# Patient Record
Sex: MALE | Race: WHITE | NOT HISPANIC OR LATINO | Employment: OTHER | URBAN - METROPOLITAN AREA
[De-identification: names, ages, dates, MRNs, and addresses within clinical notes are randomized per-mention and may not be internally consistent; named-entity substitution may affect disease eponyms.]

---

## 2020-07-25 ENCOUNTER — TELEPHONE ENCOUNTER (OUTPATIENT)
Dept: URBAN - METROPOLITAN AREA CLINIC 13 | Facility: CLINIC | Age: 80
End: 2020-07-25

## 2020-07-25 RX ORDER — LANSOPRAZOLE 15 MG/1
TAKE 1 CAPSULE DAILY PRN REFLUX CAPSULE, DELAYED RELEASE ORAL
Refills: 0 | OUTPATIENT
End: 2014-05-14

## 2020-07-25 RX ORDER — POLYETHYLENE GLYCOL 3350, SODIUM CHLORIDE, SODIUM BICARBONATE AND POTASSIUM CHLORIDE WITH LEMON FLAVOR 420; 11.2; 5.72; 1.48 G/4L; G/4L; G/4L; G/4L
TAKE  ML AS DIRECTED MIX CONTENTS PER DIRECTIONS, BEGIN DRINKING 1/2 SOLUTION @ 5P, COMPLETE REMAINDER OF SOLUTION 6HR PRIOR TO PROCEDURE POWDER, FOR SOLUTION ORAL
Qty: 1 | Refills: 0 | OUTPATIENT
Start: 2014-05-09 | End: 2014-05-14

## 2020-07-25 RX ORDER — METOPROLOL SUCCINATE 100 MG/1
TAKE 1 TABLET ONCE DAILY TABLET, EXTENDED RELEASE ORAL
Refills: 0 | OUTPATIENT
Start: 2006-03-23 | End: 2010-02-12

## 2020-07-25 RX ORDER — POLYETHYLENE GLYCOL 3350, SODIUM CHLORIDE, SODIUM BICARBONATE AND POTASSIUM CHLORIDE WITH LEMON FLAVOR 420; 11.2; 5.72; 1.48 G/4L; G/4L; G/4L; G/4L
USE AS DIRECTED POWDER, FOR SOLUTION ORAL
Qty: 1 | Refills: 0 | OUTPATIENT
Start: 2015-06-05 | End: 2015-06-08

## 2020-07-26 ENCOUNTER — TELEPHONE ENCOUNTER (OUTPATIENT)
Dept: URBAN - METROPOLITAN AREA CLINIC 13 | Facility: CLINIC | Age: 80
End: 2020-07-26

## 2020-07-26 RX ORDER — LANSOPRAZOLE 30 MG/1
TAKE 1 CAPSULE DAILY AS NEEDED CAPSULE, DELAYED RELEASE ORAL
Qty: 30 | Refills: 3 | Status: ACTIVE | COMMUNITY

## 2020-07-26 RX ORDER — DRONEDARONE 400 MG/1
TAKE 1 TABLET TWICE DAILY,  WITH MORNING AND EVENING MEAL TABLET, FILM COATED ORAL
Refills: 0 | Status: ACTIVE | COMMUNITY

## 2020-07-26 RX ORDER — ATORVASTATIN CALCIUM 10 MG/1
TAKE 1 TABLET EVERY OTHER DAY ALTERNATING WITH 0.5 TAB QOD TABLET, FILM COATED ORAL
Refills: 0 | Status: ACTIVE | COMMUNITY

## 2021-10-21 ENCOUNTER — CLAIMS CREATED FROM THE CLAIM WINDOW (OUTPATIENT)
Dept: URBAN - METROPOLITAN AREA MEDICAL CENTER 19 | Facility: MEDICAL CENTER | Age: 81
End: 2021-10-21
Payer: MEDICARE

## 2021-10-21 DIAGNOSIS — J84.10 PULMONARY FIBROSIS, UNSPECIFIED: ICD-10-CM

## 2021-10-21 DIAGNOSIS — R06.02 EXERTIONAL SHORTNESS OF BREATH: ICD-10-CM

## 2021-10-21 DIAGNOSIS — T39.395A ADVERSE EFFECT OF OTHER NONSTEROIDAL ANTI-INFLAMMATORY DRUGS [NSAID], INITIAL ENCOUNTER: ICD-10-CM

## 2021-10-21 DIAGNOSIS — D50.0 IRON DEFICIENCY ANEMIA SECONDARY TO BLOOD LOSS (CHRONIC): ICD-10-CM

## 2021-10-21 DIAGNOSIS — K44.9 DIAPHRAGMATIC HERNIA WITHOUT OBSTRUCTION OR GANGRENE: ICD-10-CM

## 2021-10-21 PROCEDURE — 99223 1ST HOSP IP/OBS HIGH 75: CPT | Performed by: INTERNAL MEDICINE

## 2021-10-22 ENCOUNTER — CLAIMS CREATED FROM THE CLAIM WINDOW (OUTPATIENT)
Dept: URBAN - METROPOLITAN AREA MEDICAL CENTER 19 | Facility: MEDICAL CENTER | Age: 81
End: 2021-10-22
Payer: MEDICARE

## 2021-10-22 DIAGNOSIS — D50.0 IRON DEFICIENCY ANEMIA SECONDARY TO BLOOD LOSS (CHRONIC): ICD-10-CM

## 2021-10-22 DIAGNOSIS — K44.9 DIAPHRAGMATIC HERNIA WITHOUT OBSTRUCTION OR GANGRENE: ICD-10-CM

## 2021-10-22 DIAGNOSIS — T39.395A ADVERSE EFFECT OF OTHER NONSTEROIDAL ANTI-INFLAMMATORY DRUGS [NSAID], INITIAL ENCOUNTER: ICD-10-CM

## 2021-10-22 DIAGNOSIS — J84.10 PULMONARY FIBROSIS, UNSPECIFIED: ICD-10-CM

## 2021-10-22 PROCEDURE — 99232 SBSQ HOSP IP/OBS MODERATE 35: CPT | Performed by: INTERNAL MEDICINE

## 2021-10-29 ENCOUNTER — OFFICE VISIT (OUTPATIENT)
Dept: URBAN - METROPOLITAN AREA CLINIC 107 | Facility: CLINIC | Age: 81
End: 2021-10-29
Payer: MEDICARE

## 2021-10-29 ENCOUNTER — WEB ENCOUNTER (OUTPATIENT)
Dept: URBAN - METROPOLITAN AREA CLINIC 107 | Facility: CLINIC | Age: 81
End: 2021-10-29

## 2021-10-29 VITALS
HEART RATE: 89 BPM | SYSTOLIC BLOOD PRESSURE: 151 MMHG | BODY MASS INDEX: 29.06 KG/M2 | HEIGHT: 70 IN | DIASTOLIC BLOOD PRESSURE: 94 MMHG | TEMPERATURE: 97 F | WEIGHT: 203 LBS

## 2021-10-29 DIAGNOSIS — K44.9 LARGE HIATAL HERNIA: ICD-10-CM

## 2021-10-29 DIAGNOSIS — D50.0 IRON DEFICIENCY ANEMIA DUE TO CHRONIC BLOOD LOSS: ICD-10-CM

## 2021-10-29 PROCEDURE — 99214 OFFICE O/P EST MOD 30 MIN: CPT | Performed by: NURSE PRACTITIONER

## 2021-10-29 RX ORDER — BRIMONIDINE TARTRATE 2 MG/ML
1 DROP INTO AFFECTED EYE SOLUTION/ DROPS OPHTHALMIC
Status: ACTIVE | COMMUNITY

## 2021-10-29 RX ORDER — BUDESONIDE AND FORMOTEROL FUMARATE DIHYDRATE 160; 4.5 UG/1; UG/1
2 PUFFS AEROSOL RESPIRATORY (INHALATION) TWICE A DAY
Status: ACTIVE | COMMUNITY

## 2021-10-29 RX ORDER — GLIMEPIRIDE 2 MG/1
1 TABLET WITH BREAKFAST OR THE FIRST MAIN MEAL OF THE DAY TABLET ORAL ONCE A DAY
Status: ACTIVE | COMMUNITY

## 2021-10-29 RX ORDER — LATANOPROST 50 UG/ML
1 DROP INTO AFFECTED EYE IN THE EVENING SOLUTION/ DROPS OPHTHALMIC ONCE A DAY
Status: ACTIVE | COMMUNITY

## 2021-10-29 RX ORDER — PREDNISONE 5 MG/1
1 TABLET TABLET ORAL 3 TIMES DAILY
Status: ACTIVE | COMMUNITY

## 2021-10-29 RX ORDER — METOPROLOL SUCCINATE 50 MG/1
1 TABLET TABLET, FILM COATED, EXTENDED RELEASE ORAL ONCE A DAY
Status: ACTIVE | COMMUNITY

## 2021-10-29 RX ORDER — GABAPENTIN 100 MG/1
1 CAPSULE CAPSULE ORAL ONCE A DAY
Status: ACTIVE | COMMUNITY

## 2021-10-29 RX ORDER — LANSOPRAZOLE 30 MG/1
TAKE 1 CAPSULE DAILY AS NEEDED CAPSULE, DELAYED RELEASE ORAL
Qty: 30 | Refills: 3 | Status: ON HOLD | COMMUNITY

## 2021-10-29 RX ORDER — OMEPRAZOLE 20 MG/1
1 CAPSULE 30 MINUTES BEFORE MORNING MEAL CAPSULE, DELAYED RELEASE ORAL TWICE DAILY
Status: ACTIVE | COMMUNITY

## 2021-10-29 RX ORDER — NINTEDANIB 100 MG/1
1 CAPSULE CAPSULE ORAL
Status: ACTIVE | COMMUNITY

## 2021-10-29 RX ORDER — OMEPRAZOLE 20 MG/1
1 CAPSULE 30 MINUTES BEFORE MORNING MEAL CAPSULE, DELAYED RELEASE ORAL TWICE DAILY
OUTPATIENT

## 2021-10-29 RX ORDER — ATORVASTATIN CALCIUM 10 MG/1
TAKE 1 TABLET EVERY OTHER DAY ALTERNATING WITH 0.5 TAB QOD TABLET, FILM COATED ORAL
Refills: 0 | Status: ON HOLD | COMMUNITY

## 2021-10-29 RX ORDER — DRONEDARONE 400 MG/1
TAKE 1 TABLET TWICE DAILY,  WITH MORNING AND EVENING MEAL TABLET, FILM COATED ORAL
Refills: 0 | Status: ON HOLD | COMMUNITY

## 2021-10-29 NOTE — HPI-TODAY'S VISIT:
81-year-old male with a past medical history of idiopathic pulmonary fibrosis on home oxygen, atrial fibrillation, provoked PE status post lumbar surgery, diverticulosis, GERD, iron deficiency anemia and large hiatal hernia presenting for follow-up after hospitalization for progressive fatigue, shortness of breath and decreasing SPO2 on home monitor, incidentally found to have anemia with a hemoglobin of 6.1 on presentation to the ED.  He was seen in consultation by Dr. Troy Gomez.  He denied any nausea or vomiting.  There was no hematemesis, hematochezia, melena or diarrhea.  He endorsed using 6 Motrin tablets daily for many years.  He had stopped PPI therapy independently.  He had a similar work-up in 2015 with Dr. Sherman including EGD revealing the large hiatal hernia, colonoscopy notable for diverticulosis and a normal capsule endoscopy.  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  He was to be transfused to maintain hemoglobin greater than 7.  He was resumed on daily PPI which she was to continue indefinitely.  He was recommended an EGD if cleared by pulmonary.  Dr. Mccurdy with pulmonology did not feel that his pulmonary status was ideal for a sedated procedure.  Therefore the EGD was not performed.  He was not having any evidence of bleeding.  He was recommended supportive treatment including transfusion for hemoglobin less than 8.  He was to continue PPI indefinitely and limit his NSAIDs.  He was also recommended outpatient referral to surgery for discussion of a hiatal hernia repair.  He tells me that his shortness of breath is better today. He denies any lightheadedness or chest pain. No nausea or vomiting. No abdominal pain. No melena or red blood per rectum. He has bowel movements daily. He stopped all NSAIDs and is only using Tylenol. He is on prednisone 15 mg daily for his pulmonary fibrosis, as well as Ofev. He is taking omeprazole 20 mg twice daily as well.

## 2021-10-30 LAB
BASO (ABSOLUTE): 0
BASOS: 0
EOS (ABSOLUTE): 0
EOS: 0
HEMATOCRIT: 37
HEMATOLOGY COMMENTS:: (no result)
HEMOGLOBIN: 10.5
IMMATURE CELLS: (no result)
IMMATURE GRANS (ABS): 0.1
IMMATURE GRANULOCYTES: 1
LYMPHS (ABSOLUTE): 1
LYMPHS: 10
MCH: 21.5
MCHC: 28.4
MCV: 76
MONOCYTES(ABSOLUTE): 0.6
MONOCYTES: 6
NEUTROPHILS (ABSOLUTE): 8.4
NEUTROPHILS: 83
NRBC: (no result)
PLATELETS: 307
RBC: 4.88
RDW: 23.6
WBC: 10.1

## 2021-11-01 ENCOUNTER — TELEPHONE ENCOUNTER (OUTPATIENT)
Dept: URBAN - METROPOLITAN AREA CLINIC 113 | Facility: CLINIC | Age: 81
End: 2021-11-01

## 2021-11-10 ENCOUNTER — TELEPHONE ENCOUNTER (OUTPATIENT)
Dept: URBAN - METROPOLITAN AREA CLINIC 113 | Facility: CLINIC | Age: 81
End: 2021-11-10

## 2021-11-13 LAB
BASO (ABSOLUTE): 0
BASOS: 0
EOS (ABSOLUTE): 0.2
EOS: 2
HEMATOCRIT: 37.8
HEMATOLOGY COMMENTS:: (no result)
HEMOGLOBIN: 10.5
IMMATURE CELLS: (no result)
IMMATURE GRANS (ABS): 0
IMMATURE GRANULOCYTES: 0
LYMPHS (ABSOLUTE): 3.2
LYMPHS: 33
MCH: 21.7
MCHC: 27.8
MCV: 78
MONOCYTES(ABSOLUTE): 0.8
MONOCYTES: 8
NEUTROPHILS (ABSOLUTE): 5.4
NEUTROPHILS: 57
NRBC: (no result)
PLATELETS: 327
RBC: 4.84
RDW: (no result)
WBC: 9.7

## 2021-11-30 ENCOUNTER — OFFICE VISIT (OUTPATIENT)
Dept: URBAN - METROPOLITAN AREA CLINIC 113 | Facility: CLINIC | Age: 81
End: 2021-11-30
Payer: MEDICARE

## 2021-11-30 VITALS
WEIGHT: 213 LBS | HEART RATE: 80 BPM | SYSTOLIC BLOOD PRESSURE: 82 MMHG | TEMPERATURE: 96.8 F | HEIGHT: 70 IN | RESPIRATION RATE: 22 BRPM | DIASTOLIC BLOOD PRESSURE: 56 MMHG | BODY MASS INDEX: 30.49 KG/M2

## 2021-11-30 DIAGNOSIS — K44.9 LARGE HIATAL HERNIA: ICD-10-CM

## 2021-11-30 DIAGNOSIS — D50.0 IRON DEFICIENCY ANEMIA DUE TO CHRONIC BLOOD LOSS: ICD-10-CM

## 2021-11-30 PROCEDURE — 99213 OFFICE O/P EST LOW 20 MIN: CPT | Performed by: NURSE PRACTITIONER

## 2021-11-30 RX ORDER — LATANOPROST 50 UG/ML
1 DROP INTO AFFECTED EYE IN THE EVENING SOLUTION/ DROPS OPHTHALMIC ONCE A DAY
Status: ACTIVE | COMMUNITY

## 2021-11-30 RX ORDER — LANSOPRAZOLE 30 MG/1
TAKE 1 CAPSULE DAILY AS NEEDED CAPSULE, DELAYED RELEASE ORAL
Qty: 30 | Refills: 3 | Status: ON HOLD | COMMUNITY

## 2021-11-30 RX ORDER — GABAPENTIN 100 MG/1
1 CAPSULE CAPSULE ORAL ONCE A DAY
Status: ACTIVE | COMMUNITY

## 2021-11-30 RX ORDER — BUDESONIDE AND FORMOTEROL FUMARATE DIHYDRATE 160; 4.5 UG/1; UG/1
2 PUFFS AEROSOL RESPIRATORY (INHALATION) TWICE A DAY
Status: ACTIVE | COMMUNITY

## 2021-11-30 RX ORDER — DRONEDARONE 400 MG/1
TAKE 1 TABLET TWICE DAILY,  WITH MORNING AND EVENING MEAL TABLET, FILM COATED ORAL
Refills: 0 | Status: ON HOLD | COMMUNITY

## 2021-11-30 RX ORDER — GLIMEPIRIDE 2 MG/1
1 TABLET WITH BREAKFAST OR THE FIRST MAIN MEAL OF THE DAY TABLET ORAL ONCE A DAY
Status: ACTIVE | COMMUNITY

## 2021-11-30 RX ORDER — METOPROLOL SUCCINATE 50 MG/1
1 TABLET TABLET, FILM COATED, EXTENDED RELEASE ORAL ONCE A DAY
Status: ACTIVE | COMMUNITY

## 2021-11-30 RX ORDER — ATORVASTATIN CALCIUM 10 MG/1
TAKE 1 TABLET EVERY OTHER DAY ALTERNATING WITH 0.5 TAB QOD TABLET, FILM COATED ORAL
Refills: 0 | Status: ON HOLD | COMMUNITY

## 2021-11-30 RX ORDER — NINTEDANIB 100 MG/1
1 CAPSULE CAPSULE ORAL
Status: ACTIVE | COMMUNITY

## 2021-11-30 RX ORDER — OMEPRAZOLE 20 MG/1
1 CAPSULE 30 MINUTES BEFORE MORNING MEAL CAPSULE, DELAYED RELEASE ORAL TWICE DAILY
Status: ACTIVE | COMMUNITY

## 2021-11-30 RX ORDER — PREDNISONE 5 MG/1
2 TABLETS TABLET ORAL ONCE A DAY
Status: ACTIVE | COMMUNITY

## 2021-11-30 RX ORDER — BRIMONIDINE TARTRATE 2 MG/ML
1 DROP INTO AFFECTED EYE SOLUTION/ DROPS OPHTHALMIC
Status: ACTIVE | COMMUNITY

## 2021-11-30 RX ORDER — OMEPRAZOLE 20 MG/1
1 CAPSULE 30 MINUTES BEFORE MORNING MEAL CAPSULE, DELAYED RELEASE ORAL TWICE DAILY
OUTPATIENT

## 2021-11-30 NOTE — HPI-TODAY'S VISIT:
81-year-old male with a past medical history of idiopathic pulmonary fibrosis on home oxygen, atrial fibrillation, provoked PE status post lumbar surgery, diverticulosis, GERD, iron deficiency anemia and large hiatal hernia presenting for follow-up after hospitalization for progressive fatigue, shortness of breath and decreasing SPO2 on home monitor, incidentally found to have anemia with a hemoglobin of 6.1 on presentation to the ED.  He was seen in consultation by Dr. Troy Gomez.  He denied any nausea or vomiting.  There was no hematemesis, hematochezia, melena or diarrhea.  He endorsed using 6 Motrin tablets daily for many years.  He had stopped PPI therapy independently.  He had a similar work-up in 2015 with Dr. Sherman including EGD revealing the large hiatal hernia, colonoscopy notable for diverticulosis and a normal capsule endoscopy.  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  He was to be transfused to maintain hemoglobin greater than 7.  He was resumed on daily PPI which she was to continue indefinitely.  He was recommended an EGD if cleared by pulmonary.  Dr. Mccurdy with pulmonology did not feel that his pulmonary status was ideal for a sedated procedure.  Therefore the EGD was not performed.  He was not having any evidence of bleeding.  He was recommended supportive treatment including transfusion for hemoglobin less than 8.  He was to continue PPI indefinitely and limit his NSAIDs.  He was also recommended outpatient referral to surgery for discussion of a hiatal hernia repair.  He tells me that his shortness of breath is better today. He denies any lightheadedness or chest pain. No nausea or vomiting. No abdominal pain. No melena or red blood per rectum. He has bowel movements daily. He stopped all NSAIDs and is only using Tylenol. He is on prednisone 15 mg daily for his pulmonary fibrosis, as well as Ofev. He is taking omeprazole 20 mg twice daily as well.  Labs on 10/29/21 showed his hemoglobin was 10.5. Repeat labs on 11/12/21 was stable at 10.5 He was planned for an appointment in 4 weeks, with repeat labs prior to that appointment.  He remains on prednisone. He recently decreased to 10 mg two days ago. The goal is to get him off of prednisone. He remains on oxygen. Over the weekend, he had a bowel movement and saw a hint of red blood. Since then, his bowel movements have been normal and without any blood per rectum.

## 2021-12-01 LAB
BASO (ABSOLUTE): 0.1
BASOS: 1
EOS (ABSOLUTE): 0.2
EOS: 1
HEMATOCRIT: 35.6
HEMATOLOGY COMMENTS:: (no result)
HEMOGLOBIN: 10.5
IMMATURE CELLS: (no result)
IMMATURE GRANS (ABS): 0
IMMATURE GRANULOCYTES: 0
LYMPHS (ABSOLUTE): 1.8
LYMPHS: 13
MCH: 22.4
MCHC: 29.5
MCV: 76
MONOCYTES(ABSOLUTE): 1.2
MONOCYTES: 9
NEUTROPHILS (ABSOLUTE): 10
NEUTROPHILS: 76
NRBC: (no result)
PLATELETS: 313
RBC: 4.69
RDW: (no result)
WBC: 13.2

## 2022-01-04 ENCOUNTER — OFFICE VISIT (OUTPATIENT)
Dept: URBAN - METROPOLITAN AREA CLINIC 113 | Facility: CLINIC | Age: 82
End: 2022-01-04

## 2022-01-05 ENCOUNTER — OFFICE VISIT (OUTPATIENT)
Dept: URBAN - METROPOLITAN AREA CLINIC 113 | Facility: CLINIC | Age: 82
End: 2022-01-05
Payer: MEDICARE

## 2022-01-05 VITALS
HEIGHT: 70 IN | RESPIRATION RATE: 18 BRPM | DIASTOLIC BLOOD PRESSURE: 81 MMHG | HEART RATE: 76 BPM | WEIGHT: 210 LBS | SYSTOLIC BLOOD PRESSURE: 122 MMHG | BODY MASS INDEX: 30.06 KG/M2 | TEMPERATURE: 97.7 F

## 2022-01-05 DIAGNOSIS — D50.0 IRON DEFICIENCY ANEMIA DUE TO CHRONIC BLOOD LOSS: ICD-10-CM

## 2022-01-05 DIAGNOSIS — K44.9 LARGE HIATAL HERNIA: ICD-10-CM

## 2022-01-05 PROCEDURE — 99213 OFFICE O/P EST LOW 20 MIN: CPT | Performed by: NURSE PRACTITIONER

## 2022-01-05 RX ORDER — GABAPENTIN 100 MG/1
1 CAPSULE CAPSULE ORAL ONCE A DAY
Status: ACTIVE | COMMUNITY

## 2022-01-05 RX ORDER — BUDESONIDE AND FORMOTEROL FUMARATE DIHYDRATE 160; 4.5 UG/1; UG/1
2 PUFFS AEROSOL RESPIRATORY (INHALATION) TWICE A DAY
Status: ACTIVE | COMMUNITY

## 2022-01-05 RX ORDER — LANSOPRAZOLE 30 MG/1
TAKE 1 CAPSULE DAILY AS NEEDED CAPSULE, DELAYED RELEASE ORAL
Qty: 30 | Refills: 3 | Status: DISCONTINUED | COMMUNITY

## 2022-01-05 RX ORDER — BRIMONIDINE TARTRATE 2 MG/ML
1 DROP INTO AFFECTED EYE SOLUTION/ DROPS OPHTHALMIC
Status: ACTIVE | COMMUNITY

## 2022-01-05 RX ORDER — DRONEDARONE 400 MG/1
TAKE 1 TABLET TWICE DAILY,  WITH MORNING AND EVENING MEAL TABLET, FILM COATED ORAL
Refills: 0 | Status: DISCONTINUED | COMMUNITY

## 2022-01-05 RX ORDER — METOPROLOL SUCCINATE 50 MG/1
1 TABLET TABLET, FILM COATED, EXTENDED RELEASE ORAL ONCE A DAY
Status: ACTIVE | COMMUNITY

## 2022-01-05 RX ORDER — NINTEDANIB 100 MG/1
1 CAPSULE CAPSULE ORAL
Status: ACTIVE | COMMUNITY

## 2022-01-05 RX ORDER — LATANOPROST 50 UG/ML
1 DROP INTO AFFECTED EYE IN THE EVENING SOLUTION/ DROPS OPHTHALMIC ONCE A DAY
Status: ACTIVE | COMMUNITY

## 2022-01-05 RX ORDER — GLIMEPIRIDE 2 MG/1
1 TABLET WITH BREAKFAST OR THE FIRST MAIN MEAL OF THE DAY TABLET ORAL ONCE A DAY
Status: ACTIVE | COMMUNITY

## 2022-01-05 RX ORDER — ATORVASTATIN CALCIUM 10 MG/1
TAKE 1 TABLET EVERY OTHER DAY ALTERNATING WITH 0.5 TAB QOD TABLET, FILM COATED ORAL
Refills: 0 | Status: ACTIVE | COMMUNITY

## 2022-01-05 RX ORDER — DORZOLAMIDE HYDROCHLORIDE AND TIMOLOL MALEATE 20; 5 MG/ML; MG/ML
1 DROP INTO AFFECTED EYE SOLUTION/ DROPS OPHTHALMIC TWICE A DAY
Status: ACTIVE | COMMUNITY

## 2022-01-05 RX ORDER — PREDNISONE 5 MG/1
1 TABLET TABLET ORAL ONCE A DAY
Status: ACTIVE | COMMUNITY

## 2022-01-05 RX ORDER — OMEPRAZOLE 20 MG/1
1 CAPSULE 30 MINUTES BEFORE MORNING MEAL CAPSULE, DELAYED RELEASE ORAL TWICE DAILY
Status: ACTIVE | COMMUNITY

## 2022-01-05 RX ORDER — OMEPRAZOLE 20 MG/1
1 CAPSULE 30 MINUTES BEFORE MORNING MEAL CAPSULE, DELAYED RELEASE ORAL TWICE DAILY
OUTPATIENT

## 2022-01-05 NOTE — HPI-TODAY'S VISIT:
81-year-old male with a past medical history of idiopathic pulmonary fibrosis on home oxygen, atrial fibrillation, provoked PE status post lumbar surgery, diverticulosis, GERD, iron deficiency anemia and large hiatal hernia presenting for follow-up after hospitalization for progressive fatigue, shortness of breath and decreasing SPO2 on home monitor, incidentally found to have anemia with a hemoglobin of 6.1 on presentation to the ED.  He was seen in consultation by Dr. Troy Gomez.  He denied any nausea or vomiting.  There was no hematemesis, hematochezia, melena or diarrhea.  He endorsed using 6 Motrin tablets daily for many years.  He had stopped PPI therapy independently.  He had a similar work-up in 2015 with Dr. Sherman including EGD revealing the large hiatal hernia, colonoscopy notable for diverticulosis and a normal capsule endoscopy.  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  He was to be transfused to maintain hemoglobin greater than 7.  He was resumed on daily PPI which she was to continue indefinitely.  He was recommended an EGD if cleared by pulmonary.  Dr. Mccurdy with pulmonology did not feel that his pulmonary status was ideal for a sedated procedure.  Therefore the EGD was not performed.  He was not having any evidence of bleeding.  He was recommended supportive treatment including transfusion for hemoglobin less than 8.  He was to continue PPI indefinitely and limit his NSAIDs.  He was also recommended outpatient referral to surgery for discussion of a hiatal hernia repair.  He tells me that his shortness of breath is better today. He denies any lightheadedness or chest pain. No nausea or vomiting. No abdominal pain. No melena or red blood per rectum. He has bowel movements daily. He stopped all NSAIDs and is only using Tylenol. He is on prednisone 15 mg daily for his pulmonary fibrosis, as well as Ofev. He is taking omeprazole 20 mg twice daily as well.  Labs on 10/29/21 showed his hemoglobin was 10.5. Repeat labs on 11/12/21 was stable at 10.5 He was planned for an appointment in 4 weeks, with repeat labs prior to that appointment.  11/30/21: He remains on prednisone. He recently decreased to 10 mg two days ago. The goal is to get him off of prednisone. He remains on oxygen. Over the weekend, he had a bowel movement and saw a hint of red blood. Since then, his bowel movements have been normal and without any blood per rectum.  1/5/21: His labs on 11/30/21 showed a stable hemoglobin at 10.5. He has not had any repeat labs since this time. His energy levels continue to improve. He tells me that he is participating in pulmonary rehab. He is not struggling to catch his breath as badly. There has been a decrease in his steroids per pulmonology. With decreasing steroids, his appetite is somewhat lessened. His weight is stable. He remains on oxygen.

## 2022-01-06 LAB
BASO (ABSOLUTE): 0.1
BASOS: 1
EOS (ABSOLUTE): 0.1
EOS: 1
HEMATOCRIT: 34.9
HEMATOLOGY COMMENTS:: (no result)
HEMOGLOBIN: 10.6
IMMATURE CELLS: (no result)
IMMATURE GRANS (ABS): 0
IMMATURE GRANULOCYTES: 0
LYMPHS (ABSOLUTE): 1.2
LYMPHS: 14
MCH: 23.2
MCHC: 30.4
MCV: 77
MONOCYTES(ABSOLUTE): 0.6
MONOCYTES: 7
NEUTROPHILS (ABSOLUTE): 6.4
NEUTROPHILS: 77
NRBC: (no result)
PLATELETS: 304
RBC: 4.56
RDW: 20.4
WBC: 8.4

## 2022-02-02 ENCOUNTER — OFFICE VISIT (OUTPATIENT)
Dept: URBAN - METROPOLITAN AREA CLINIC 113 | Facility: CLINIC | Age: 82
End: 2022-02-02
Payer: MEDICARE

## 2022-02-02 VITALS
HEIGHT: 70 IN | DIASTOLIC BLOOD PRESSURE: 72 MMHG | WEIGHT: 210 LBS | TEMPERATURE: 987.5 F | SYSTOLIC BLOOD PRESSURE: 100 MMHG | BODY MASS INDEX: 30.06 KG/M2

## 2022-02-02 DIAGNOSIS — K44.9 LARGE HIATAL HERNIA: ICD-10-CM

## 2022-02-02 DIAGNOSIS — D50.0 IRON DEFICIENCY ANEMIA DUE TO CHRONIC BLOOD LOSS: ICD-10-CM

## 2022-02-02 PROCEDURE — 99214 OFFICE O/P EST MOD 30 MIN: CPT | Performed by: NURSE PRACTITIONER

## 2022-02-02 RX ORDER — GABAPENTIN 100 MG/1
1 CAPSULE CAPSULE ORAL ONCE A DAY
Status: ACTIVE | COMMUNITY

## 2022-02-02 RX ORDER — BUDESONIDE AND FORMOTEROL FUMARATE DIHYDRATE 160; 4.5 UG/1; UG/1
2 PUFFS AEROSOL RESPIRATORY (INHALATION) TWICE A DAY
Status: ACTIVE | COMMUNITY

## 2022-02-02 RX ORDER — METOPROLOL SUCCINATE 50 MG/1
1 TABLET TABLET, FILM COATED, EXTENDED RELEASE ORAL ONCE A DAY
Status: ACTIVE | COMMUNITY

## 2022-02-02 RX ORDER — BRIMONIDINE TARTRATE 2 MG/ML
1 DROP INTO AFFECTED EYE SOLUTION/ DROPS OPHTHALMIC
Status: ACTIVE | COMMUNITY

## 2022-02-02 RX ORDER — GLIMEPIRIDE 2 MG/1
1 TABLET WITH BREAKFAST OR THE FIRST MAIN MEAL OF THE DAY TABLET ORAL ONCE A DAY
Status: ACTIVE | COMMUNITY

## 2022-02-02 RX ORDER — OMEPRAZOLE 20 MG/1
1 CAPSULE 30 MINUTES BEFORE MORNING MEAL CAPSULE, DELAYED RELEASE ORAL TWICE DAILY
OUTPATIENT

## 2022-02-02 RX ORDER — NINTEDANIB 100 MG/1
1 CAPSULE CAPSULE ORAL
Status: ACTIVE | COMMUNITY

## 2022-02-02 RX ORDER — LATANOPROST 50 UG/ML
1 DROP INTO AFFECTED EYE IN THE EVENING SOLUTION/ DROPS OPHTHALMIC ONCE A DAY
Status: ACTIVE | COMMUNITY

## 2022-02-02 RX ORDER — ATORVASTATIN CALCIUM 10 MG/1
TAKE 1 TABLET EVERY OTHER DAY ALTERNATING WITH 0.5 TAB QOD TABLET, FILM COATED ORAL
Refills: 0 | Status: ACTIVE | COMMUNITY

## 2022-02-02 RX ORDER — PREDNISONE 5 MG/1
1 TABLET TABLET ORAL ONCE A DAY
Status: ACTIVE | COMMUNITY

## 2022-02-02 RX ORDER — DORZOLAMIDE HYDROCHLORIDE AND TIMOLOL MALEATE 20; 5 MG/ML; MG/ML
1 DROP INTO AFFECTED EYE SOLUTION/ DROPS OPHTHALMIC TWICE A DAY
Status: ACTIVE | COMMUNITY

## 2022-02-02 RX ORDER — OMEPRAZOLE 20 MG/1
1 CAPSULE 30 MINUTES BEFORE MORNING MEAL CAPSULE, DELAYED RELEASE ORAL TWICE DAILY
Status: ACTIVE | COMMUNITY

## 2022-02-02 NOTE — HPI-TODAY'S VISIT:
81-year-old male with a past medical history of idiopathic pulmonary fibrosis on home oxygen, atrial fibrillation, provoked PE status post lumbar surgery, diverticulosis, GERD, iron deficiency anemia and large hiatal hernia presenting for follow-up after hospitalization for progressive fatigue, shortness of breath and decreasing SPO2 on home monitor, incidentally found to have anemia with a hemoglobin of 6.1 on presentation to the ED.  He was seen in consultation by Dr. Troy Gomez.  He denied any nausea or vomiting.  There was no hematemesis, hematochezia, melena or diarrhea.  He endorsed using 6 Motrin tablets daily for many years.  He had stopped PPI therapy independently.  He had a similar work-up in 2015 with Dr. Goff including EGD revealing the large hiatal hernia, colonoscopy notable for diverticulosis and a normal capsule endoscopy.  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  He was to be transfused to maintain hemoglobin greater than 7.  He was resumed on daily PPI which she was to continue indefinitely.  He was recommended an EGD if cleared by pulmonary.  Dr. Mccurdy with pulmonology did not feel that his pulmonary status was ideal for a sedated procedure.  Therefore the EGD was not performed.  He was not having any evidence of bleeding.  He was recommended supportive treatment including transfusion for hemoglobin less than 8.  He was to continue PPI indefinitely and limit his NSAIDs.  He was also recommended outpatient referral to surgery for discussion of a hiatal hernia repair.  He tells me that his shortness of breath is better today. He denies any lightheadedness or chest pain. No nausea or vomiting. No abdominal pain. No melena or red blood per rectum. He has bowel movements daily. He stopped all NSAIDs and is only using Tylenol. He is on prednisone 15 mg daily for his pulmonary fibrosis, as well as Ofev. He is taking omeprazole 20 mg twice daily as well.  Labs on 10/29/21 showed his hemoglobin was 10.5. Repeat labs on 11/12/21 was stable at 10.5 He was planned for an appointment in 4 weeks, with repeat labs prior to that appointment.  11/30/21: He remains on prednisone. He recently decreased to 10 mg two days ago. The goal is to get him off of prednisone. He remains on oxygen. Over the weekend, he had a bowel movement and saw a hint of red blood. Since then, his bowel movements have been normal and without any blood per rectum.  1/5/21: His labs on 11/30/21 showed a stable hemoglobin at 10.5. He has not had any repeat labs since this time. His energy levels continue to improve. He tells me that he is participating in pulmonary rehab. He is not struggling to catch his breath as badly. There has been a decrease in his steroids per pulmonology. With decreasing steroids, his appetite is somewhat lessened. His weight is stable. He remains on oxygen.  2/2/22: He seems to be doing okay. He gets tired and more short of breath with early morning activities.  This is short lived. No GI blood loss. No abdominal pain, nausea or vomiting. Weight is stable. He continues with pulmonary rehab; he had a few weeks off after bilateral cataract surgery. He returns to pulmonary rehab tomorrow. He is unsure when he follows back up with Dr. Mccurdy.

## 2022-02-03 LAB
BASO (ABSOLUTE): 0.1
BASOS: 1
EOS (ABSOLUTE): 0.2
EOS: 2
HEMATOCRIT: 35.3
HEMATOLOGY COMMENTS:: (no result)
HEMOGLOBIN: 10.4
IMMATURE CELLS: (no result)
IMMATURE GRANS (ABS): 0
IMMATURE GRANULOCYTES: 0
LYMPHS (ABSOLUTE): 2.3
LYMPHS: 23
MCH: 24
MCHC: 29.5
MCV: 82
MONOCYTES(ABSOLUTE): 1.1
MONOCYTES: 11
NEUTROPHILS (ABSOLUTE): 6.3
NEUTROPHILS: 63
NRBC: (no result)
PLATELETS: 295
RBC: 4.33
RDW: 16.8
WBC: 10

## 2022-03-16 ENCOUNTER — OFFICE VISIT (OUTPATIENT)
Dept: URBAN - METROPOLITAN AREA CLINIC 113 | Facility: CLINIC | Age: 82
End: 2022-03-16
Payer: MEDICARE

## 2022-03-16 VITALS
HEART RATE: 91 BPM | TEMPERATURE: 97.3 F | BODY MASS INDEX: 29.92 KG/M2 | DIASTOLIC BLOOD PRESSURE: 95 MMHG | WEIGHT: 209 LBS | SYSTOLIC BLOOD PRESSURE: 141 MMHG | HEIGHT: 70 IN

## 2022-03-16 DIAGNOSIS — K44.9 LARGE HIATAL HERNIA: ICD-10-CM

## 2022-03-16 DIAGNOSIS — D50.0 IRON DEFICIENCY ANEMIA DUE TO CHRONIC BLOOD LOSS: ICD-10-CM

## 2022-03-16 PROCEDURE — 99213 OFFICE O/P EST LOW 20 MIN: CPT | Performed by: NURSE PRACTITIONER

## 2022-03-16 RX ORDER — NINTEDANIB 100 MG/1
1 CAPSULE CAPSULE ORAL
Status: ACTIVE | COMMUNITY

## 2022-03-16 RX ORDER — OMEPRAZOLE 20 MG/1
1 CAPSULE 30 MINUTES BEFORE MORNING MEAL CAPSULE, DELAYED RELEASE ORAL TWICE DAILY
Status: ACTIVE | COMMUNITY

## 2022-03-16 RX ORDER — GLIMEPIRIDE 2 MG/1
1 TABLET WITH BREAKFAST OR THE FIRST MAIN MEAL OF THE DAY TABLET ORAL ONCE A DAY
Status: ACTIVE | COMMUNITY

## 2022-03-16 RX ORDER — METOPROLOL SUCCINATE 25 MG/1
1 TABLET TABLET, FILM COATED, EXTENDED RELEASE ORAL ONCE A DAY
Status: ACTIVE | COMMUNITY

## 2022-03-16 RX ORDER — PREDNISONE 5 MG/1
1 TABLET TABLET ORAL ONCE A DAY
Status: ACTIVE | COMMUNITY

## 2022-03-16 RX ORDER — LATANOPROST 50 UG/ML
1 DROP INTO AFFECTED EYE IN THE EVENING SOLUTION/ DROPS OPHTHALMIC ONCE A DAY
Status: ACTIVE | COMMUNITY

## 2022-03-16 RX ORDER — GABAPENTIN 100 MG/1
1 CAPSULE CAPSULE ORAL ONCE A DAY
Status: ACTIVE | COMMUNITY

## 2022-03-16 RX ORDER — DORZOLAMIDE HYDROCHLORIDE AND TIMOLOL MALEATE 20; 5 MG/ML; MG/ML
1 DROP INTO AFFECTED EYE SOLUTION/ DROPS OPHTHALMIC TWICE A DAY
Status: ACTIVE | COMMUNITY

## 2022-03-16 RX ORDER — OMEPRAZOLE 20 MG/1
1 CAPSULE 30 MINUTES BEFORE MORNING MEAL CAPSULE, DELAYED RELEASE ORAL TWICE DAILY
OUTPATIENT

## 2022-03-16 RX ORDER — BUDESONIDE AND FORMOTEROL FUMARATE DIHYDRATE 160; 4.5 UG/1; UG/1
2 PUFFS AEROSOL RESPIRATORY (INHALATION) TWICE A DAY
Status: ACTIVE | COMMUNITY

## 2022-03-16 RX ORDER — ATORVASTATIN CALCIUM 10 MG/1
TAKE 1 TABLET EVERY OTHER DAY ALTERNATING WITH 0.5 TAB QOD TABLET, FILM COATED ORAL
Refills: 0 | Status: ACTIVE | COMMUNITY

## 2022-03-16 RX ORDER — BRIMONIDINE TARTRATE 2 MG/ML
1 DROP INTO AFFECTED EYE SOLUTION/ DROPS OPHTHALMIC
Status: ACTIVE | COMMUNITY

## 2022-03-16 NOTE — HPI-TODAY'S VISIT:
This is an 81-year-old male with a past medical history of idiopathic pulmonary fibrosis on home oxygen, atrial fibrillation, provoked PE status post lumbar surgery, diverticulosis, GERD, iron deficiency anemia and large hiatal hernia, presenting for follow-up regarding anemia.  We have been following him closely over the last several months following a hospitalization for progressive fatigue, shortness of breath, decreasing SPO2 on home monitor, incidentally found to have anemia with a hemoglobin of 6.1 on presentation to the ED.  He was recommended to stop NSAIDs and resume daily PPI.  He was not felt to be a good candidate for a sedated procedure given his pulmonary status.  Hemoglobin has been fairly stable around 10.4 for the last several months.  On 2/25/22 he had labs at [facility] that showed an improved hemoglobin to 10.9. This was acquired in the ED at [facility] following a syncopal episode at Dr. Mccurdy's office. He saw Dr. Stafofrd on 3/3/22, who decreased his metoprolol following the syncopal episode.   Patient is without any abdominal complaints. No dysphagia, heartburn, regurgitation, unintentional weight loss, nausea, vomiting, hematemesis or melena. Bowels are moving regularly without blood per rectum. No complaints of bloating. No jaundice, icterus.

## 2022-05-16 ENCOUNTER — LAB OUTSIDE AN ENCOUNTER (OUTPATIENT)
Dept: URBAN - METROPOLITAN AREA CLINIC 113 | Facility: CLINIC | Age: 82
End: 2022-05-16

## 2022-06-22 ENCOUNTER — OFFICE VISIT (OUTPATIENT)
Dept: URBAN - METROPOLITAN AREA CLINIC 113 | Facility: CLINIC | Age: 82
End: 2022-06-22
Payer: MEDICARE

## 2022-06-22 VITALS
TEMPERATURE: 98.8 F | BODY MASS INDEX: 29.35 KG/M2 | WEIGHT: 205 LBS | HEIGHT: 70 IN | SYSTOLIC BLOOD PRESSURE: 82 MMHG | HEART RATE: 94 BPM | DIASTOLIC BLOOD PRESSURE: 58 MMHG

## 2022-06-22 DIAGNOSIS — D50.0 IRON DEFICIENCY ANEMIA DUE TO CHRONIC BLOOD LOSS: ICD-10-CM

## 2022-06-22 DIAGNOSIS — K44.9 LARGE HIATAL HERNIA: ICD-10-CM

## 2022-06-22 PROCEDURE — 99213 OFFICE O/P EST LOW 20 MIN: CPT | Performed by: INTERNAL MEDICINE

## 2022-06-22 RX ORDER — BRIMONIDINE TARTRATE 2 MG/ML
1 DROP INTO AFFECTED EYE SOLUTION/ DROPS OPHTHALMIC
Status: ACTIVE | COMMUNITY

## 2022-06-22 RX ORDER — HYDROCODONE BITARTRATE AND ACETAMINOPHEN 5; 325 MG/1; MG/1
1 TABLET AS NEEDED TABLET ORAL
Status: ACTIVE | COMMUNITY

## 2022-06-22 RX ORDER — OMEPRAZOLE 20 MG/1
1 CAPSULE 30 MINUTES BEFORE MORNING MEAL CAPSULE, DELAYED RELEASE ORAL TWICE DAILY
Status: ACTIVE | COMMUNITY

## 2022-06-22 RX ORDER — GLIMEPIRIDE 2 MG/1
1 TABLET WITH BREAKFAST OR THE FIRST MAIN MEAL OF THE DAY TABLET ORAL ONCE A DAY
Status: ACTIVE | COMMUNITY

## 2022-06-22 RX ORDER — OMEPRAZOLE 20 MG/1
1 CAPSULE 30 MINUTES BEFORE MORNING MEAL CAPSULE, DELAYED RELEASE ORAL TWICE DAILY
OUTPATIENT

## 2022-06-22 RX ORDER — ATORVASTATIN CALCIUM 10 MG/1
TAKE 1 TABLET EVERY OTHER DAY ALTERNATING WITH 0.5 TAB QOD TABLET, FILM COATED ORAL
Refills: 0 | Status: ACTIVE | COMMUNITY

## 2022-06-22 RX ORDER — LATANOPROST 50 UG/ML
1 DROP INTO AFFECTED EYE IN THE EVENING SOLUTION/ DROPS OPHTHALMIC ONCE A DAY
Status: ACTIVE | COMMUNITY

## 2022-06-22 RX ORDER — NINTEDANIB 100 MG/1
1 CAPSULE CAPSULE ORAL
Status: ACTIVE | COMMUNITY

## 2022-06-22 RX ORDER — GABAPENTIN 100 MG/1
1 CAPSULE CAPSULE ORAL ONCE A DAY
Status: ACTIVE | COMMUNITY

## 2022-06-22 RX ORDER — BUDESONIDE AND FORMOTEROL FUMARATE DIHYDRATE 160; 4.5 UG/1; UG/1
2 PUFFS AEROSOL RESPIRATORY (INHALATION) TWICE A DAY
Status: ACTIVE | COMMUNITY

## 2022-06-22 RX ORDER — PREDNISONE 5 MG/1
1 TABLET TABLET ORAL ONCE A DAY
Status: ACTIVE | COMMUNITY

## 2022-06-22 RX ORDER — DORZOLAMIDE HYDROCHLORIDE AND TIMOLOL MALEATE 20; 5 MG/ML; MG/ML
1 DROP INTO AFFECTED EYE SOLUTION/ DROPS OPHTHALMIC TWICE A DAY
Status: ACTIVE | COMMUNITY

## 2022-06-22 NOTE — HPI-TODAY'S VISIT:
This is an 81-year-old male with a past medical history of idiopathic pulmonary fibrosis on home oxygen, atrial fibrillation, provoked PE status post lumbar surgery, diverticulosis, GERD, iron deficiency anemia and large hiatal hernia, presenting for follow-up regarding anemia.  We have been following him closely over the last several months following a hospitalization for progressive fatigue, shortness of breath, decreasing SPO2 on home monitor, incidentally found to have anemia with a hemoglobin of 6.1 on presentation to the ED.  He was recommended to stop NSAIDs and resume daily PPI.  He was not felt to be a good candidate for a sedated procedure given his pulmonary status.  Hemoglobin has been fairly stable around 10.4 for the last several months.  On 2/25/22 he had labs at [facility] that showed an improved hemoglobin to 10.9. This was acquired in the ED at [facility] following a syncopal episode at Dr. Mccurdy's office. He saw Dr. Stafford on 3/3/22, who decreased his metoprolol following the syncopal episode.   Patient is without any abdominal complaints. No dysphagia, heartburn, regurgitation, unintentional weight loss, nausea, vomiting, hematemesis or melena. Bowels are moving regularly without blood per rectum. No complaints of bloating. No jaundice, icterus.  6/22/22: He tells me that he is doing okay. His breathing is about the same. He is no longer going to pulmonary rehab.  Patient is without any abdominal complaints. No dysphagia, heartburn, regurgitation, unintentional weight loss, nausea, vomiting, hematemesis or melena. Bowels are moving regularly without blood per rectum. No complaints of bloating. No jaundice, icterus.

## 2022-06-23 LAB
BASO (ABSOLUTE): 0.1
BASOS: 1
EOS (ABSOLUTE): 0.4
EOS: 4
HEMATOCRIT: 34.4
HEMATOLOGY COMMENTS:: (no result)
HEMOGLOBIN: 9.7
IMMATURE CELLS: (no result)
IMMATURE GRANS (ABS): 0
IMMATURE GRANULOCYTES: 0
LYMPHS (ABSOLUTE): 1.4
LYMPHS: 15
MCH: 20.6
MCHC: 28.2
MCV: 73
MONOCYTES(ABSOLUTE): 0.9
MONOCYTES: 9
NEUTROPHILS (ABSOLUTE): 6.6
NEUTROPHILS: 71
NRBC: (no result)
PLATELETS: 325
RBC: 4.72
RDW: 17.1
WBC: 9.4

## 2022-10-04 ENCOUNTER — OFFICE VISIT (OUTPATIENT)
Dept: URBAN - METROPOLITAN AREA CLINIC 113 | Facility: CLINIC | Age: 82
End: 2022-10-04

## 2022-10-13 ENCOUNTER — OFFICE VISIT (OUTPATIENT)
Dept: URBAN - METROPOLITAN AREA CLINIC 113 | Facility: CLINIC | Age: 82
End: 2022-10-13
Payer: MEDICARE

## 2022-10-13 VITALS — RESPIRATION RATE: 22 BRPM | BODY MASS INDEX: 28.63 KG/M2 | HEIGHT: 70 IN | TEMPERATURE: 96.9 F | WEIGHT: 200 LBS

## 2022-10-13 DIAGNOSIS — D50.0 IRON DEFICIENCY ANEMIA DUE TO CHRONIC BLOOD LOSS: ICD-10-CM

## 2022-10-13 DIAGNOSIS — K44.9 LARGE HIATAL HERNIA: ICD-10-CM

## 2022-10-13 DIAGNOSIS — J84.10 PULMONARY FIBROSIS: ICD-10-CM

## 2022-10-13 PROCEDURE — 99214 OFFICE O/P EST MOD 30 MIN: CPT

## 2022-10-13 RX ORDER — DORZOLAMIDE HYDROCHLORIDE AND TIMOLOL MALEATE 20; 5 MG/ML; MG/ML
1 DROP INTO AFFECTED EYE SOLUTION/ DROPS OPHTHALMIC TWICE A DAY
Status: ACTIVE | COMMUNITY

## 2022-10-13 RX ORDER — PREDNISONE 5 MG/1
1 TABLET TABLET ORAL ONCE A DAY
Status: ACTIVE | COMMUNITY

## 2022-10-13 RX ORDER — GLIMEPIRIDE 2 MG/1
1 TABLET WITH BREAKFAST OR THE FIRST MAIN MEAL OF THE DAY TABLET ORAL ONCE A DAY
Status: ACTIVE | COMMUNITY

## 2022-10-13 RX ORDER — NINTEDANIB 150 MG/1
1 CAPSULE CAPSULE ORAL
Status: ACTIVE | COMMUNITY

## 2022-10-13 RX ORDER — ATORVASTATIN CALCIUM 10 MG/1
TAKE 1 TABLET EVERY OTHER DAY ALTERNATING WITH 0.5 TAB QOD TABLET, FILM COATED ORAL
Refills: 0 | Status: ACTIVE | COMMUNITY

## 2022-10-13 RX ORDER — BRIMONIDINE TARTRATE 2 MG/ML
1 DROP INTO AFFECTED EYE SOLUTION/ DROPS OPHTHALMIC
Status: ACTIVE | COMMUNITY

## 2022-10-13 RX ORDER — HYDROCODONE BITARTRATE AND ACETAMINOPHEN 5; 325 MG/1; MG/1
1 TABLET AS NEEDED TABLET ORAL
Status: ACTIVE | COMMUNITY

## 2022-10-13 RX ORDER — LATANOPROST 50 UG/ML
1 DROP INTO AFFECTED EYE IN THE EVENING SOLUTION/ DROPS OPHTHALMIC ONCE A DAY
Status: ACTIVE | COMMUNITY

## 2022-10-13 RX ORDER — GABAPENTIN 100 MG/1
1 CAPSULE CAPSULE ORAL ONCE A DAY
Status: ACTIVE | COMMUNITY

## 2022-10-13 RX ORDER — BUDESONIDE AND FORMOTEROL FUMARATE DIHYDRATE 160; 4.5 UG/1; UG/1
2 PUFFS AEROSOL RESPIRATORY (INHALATION) TWICE A DAY
Status: ACTIVE | COMMUNITY

## 2022-10-13 RX ORDER — OMEPRAZOLE 20 MG/1
1 CAPSULE 30 MINUTES BEFORE MORNING MEAL CAPSULE, DELAYED RELEASE ORAL TWICE DAILY
Status: ACTIVE | COMMUNITY

## 2022-10-13 RX ORDER — OMEPRAZOLE 20 MG/1
1 CAPSULE 30 MINUTES BEFORE MORNING MEAL CAPSULE, DELAYED RELEASE ORAL TWICE DAILY
OUTPATIENT

## 2022-10-13 NOTE — PHYSICAL EXAM CONSTITUTIONAL:
normal , pleasant, well nourished, in no acute distress , confined to a wheelchair, on continuous oxygen

## 2022-10-13 NOTE — HPI-TODAY'S VISIT:
This is an 81-year-old male with a past medical history of idiopathic pulmonary fibrosis on home oxygen, atrial fibrillation, provoked PE status post lumbar surgery, diverticulosis, GERD, iron deficiency anemia and large hiatal hernia, presenting for follow-up regarding anemia.    He was last seen on 6/22/2022.  He was hospitalized in October 2021 for possible GI bleed with a hemoglobin of 6.1, improved to 9.2 after multiple blood transfusions.  He was taking Motrin as many as 6 tablets/day raising suspicion for peptic ulcer disease as well as blood loss from a large hiatal hernia.  He had stopped NSAIDs in favor of Tylenol.  He was taking prednisone 5 mg daily for pulmonary fibrosis under the direction of Dr. Mccurdy.  He was not felt to be a candidate for EGD given his pulmonary status.  After discussing with the patient, he preferred a conservative approach with trending of hemoglobin.  He is not comfortable entertaining a hiatal hernia repair given his pulmonary fibrosis.  Hemoglobin on 2/25/2022 following the syncopal episode was 10.9.  His blood pressure medications are being titrated by his PCP.  He is planned for repeat hemoglobin in 8 weeks with follow-up appointment in 3 months.  Labs 6/22/2022:Hemoglobin 9.7, hematocrit 34.4, MCV 73.  Patient presents with his daughter. He continues to have difficulty breathing. He is on continuous oxygen. He is followed by Dr. Mccurdy. His Ofev has been increased to 150 mg BID. He had an echo and stress test performed after Labor Day. Based on his results, they did not feel an increase on oxygen therapy was warranted. He does admit to diarrhea predominant change in bowel habits due to Ofev. He takes an Imodium every 1-2 days with good control of his bowels. He denies any chest pain, palpitations or dizziness. He denies blood per rectum or melena. He remains on Omeprazole 20 mg BID.

## 2022-10-14 LAB
ABSOLUTE BASOPHILS: 60
ABSOLUTE EOSINOPHILS: 213
ABSOLUTE LYMPHOCYTES: 1802
ABSOLUTE MONOCYTES: 833
ABSOLUTE NEUTROPHILS: 5593
BASOPHILS: 0.7
EOSINOPHILS: 2.5
HEMATOCRIT: 35.3
HEMOGLOBIN: 10.3
LYMPHOCYTES: 21.2
MCH: 20.9
MCHC: 29.2
MCV: 71.5
MONOCYTES: 9.8
MPV: 9.1
NEUTROPHILS: 65.8
PLATELET COUNT: 302
RDW: 19.7
RED BLOOD CELL COUNT: 4.94
WHITE BLOOD CELL COUNT: 8.5

## 2023-01-13 ENCOUNTER — DASHBOARD ENCOUNTERS (OUTPATIENT)
Age: 83
End: 2023-01-13

## 2023-01-13 ENCOUNTER — OFFICE VISIT (OUTPATIENT)
Dept: URBAN - METROPOLITAN AREA CLINIC 113 | Facility: CLINIC | Age: 83
End: 2023-01-13
Payer: MEDICARE

## 2023-01-13 VITALS
HEIGHT: 70 IN | HEART RATE: 78 BPM | DIASTOLIC BLOOD PRESSURE: 60 MMHG | RESPIRATION RATE: 16 BRPM | SYSTOLIC BLOOD PRESSURE: 100 MMHG | TEMPERATURE: 97.5 F

## 2023-01-13 DIAGNOSIS — J84.10 PULMONARY FIBROSIS: ICD-10-CM

## 2023-01-13 DIAGNOSIS — D50.0 IRON DEFICIENCY ANEMIA DUE TO CHRONIC BLOOD LOSS: ICD-10-CM

## 2023-01-13 DIAGNOSIS — K44.9 LARGE HIATAL HERNIA: ICD-10-CM

## 2023-01-13 PROBLEM — 51615001: Status: ACTIVE | Noted: 2022-10-13

## 2023-01-13 PROBLEM — 84089009: Status: ACTIVE | Noted: 2023-01-13

## 2023-01-13 PROBLEM — 724556004: Status: ACTIVE | Noted: 2021-10-29

## 2023-01-13 PROCEDURE — 99212 OFFICE O/P EST SF 10 MIN: CPT | Performed by: INTERNAL MEDICINE

## 2023-01-13 RX ORDER — GLIMEPIRIDE 2 MG/1
1 TABLET WITH BREAKFAST OR THE FIRST MAIN MEAL OF THE DAY TABLET ORAL ONCE A DAY
Status: ACTIVE | COMMUNITY

## 2023-01-13 RX ORDER — OMEPRAZOLE 20 MG/1
1 CAPSULE 30 MINUTES BEFORE MORNING MEAL CAPSULE, DELAYED RELEASE ORAL TWICE DAILY
Status: ACTIVE | COMMUNITY

## 2023-01-13 RX ORDER — PREDNISONE 5 MG/1
1 TABLET TABLET ORAL ONCE A DAY
Status: ACTIVE | COMMUNITY

## 2023-01-13 RX ORDER — GABAPENTIN 100 MG/1
1 CAPSULE CAPSULE ORAL ONCE A DAY
Status: ACTIVE | COMMUNITY

## 2023-01-13 RX ORDER — OMEPRAZOLE 20 MG/1
1 CAPSULE 30 MINUTES BEFORE MORNING MEAL CAPSULE, DELAYED RELEASE ORAL TWICE DAILY
OUTPATIENT

## 2023-01-13 RX ORDER — DORZOLAMIDE HYDROCHLORIDE AND TIMOLOL MALEATE 20; 5 MG/ML; MG/ML
1 DROP INTO AFFECTED EYE SOLUTION/ DROPS OPHTHALMIC TWICE A DAY
Status: ACTIVE | COMMUNITY

## 2023-01-13 RX ORDER — BRIMONIDINE TARTRATE 2 MG/ML
1 DROP INTO AFFECTED EYE SOLUTION/ DROPS OPHTHALMIC
Status: ACTIVE | COMMUNITY

## 2023-01-13 RX ORDER — BUDESONIDE AND FORMOTEROL FUMARATE DIHYDRATE 160; 4.5 UG/1; UG/1
2 PUFFS AEROSOL RESPIRATORY (INHALATION) TWICE A DAY
Status: ACTIVE | COMMUNITY

## 2023-01-13 RX ORDER — NINTEDANIB 150 MG/1
1 CAPSULE CAPSULE ORAL
Status: ACTIVE | COMMUNITY

## 2023-01-13 RX ORDER — ATORVASTATIN CALCIUM 10 MG/1
TAKE 1 TABLET EVERY OTHER DAY ALTERNATING WITH 0.5 TAB QOD TABLET, FILM COATED ORAL
Refills: 0 | Status: ACTIVE | COMMUNITY

## 2023-01-13 RX ORDER — HYDROCODONE BITARTRATE AND ACETAMINOPHEN 5; 325 MG/1; MG/1
1 TABLET AS NEEDED TABLET ORAL
Status: ACTIVE | COMMUNITY

## 2023-01-13 RX ORDER — LATANOPROST 50 UG/ML
1 DROP INTO AFFECTED EYE IN THE EVENING SOLUTION/ DROPS OPHTHALMIC ONCE A DAY
Status: ACTIVE | COMMUNITY

## 2023-01-13 NOTE — HPI-TODAY'S VISIT:
This is an 82-year-old male with a past medical history of idiopathic pulmonary fibrosis on home oxygen, atrial fibrillation, provoked PE status post lumbar surgery, diverticulosis, GERD, iron deficiency anemia and large hiatal hernia, presenting for follow-up regarding anemia.    He was last seen on 6/22/2022.  He was hospitalized in October 2021 for possible GI bleed with a hemoglobin of 6.1, improved to 9.2 after multiple blood transfusions.  He was taking Motrin as many as 6 tablets/day raising suspicion for peptic ulcer disease as well as blood loss from a large hiatal hernia.  He had stopped NSAIDs in favor of Tylenol.  He was taking prednisone 5 mg daily for pulmonary fibrosis under the direction of Dr. Mccurdy.  He was not felt to be a candidate for EGD given his pulmonary status.  After discussing with the patient, he preferred a conservative approach with trending of hemoglobin.  He is not comfortable entertaining a hiatal hernia repair given his pulmonary fibrosis.  Hemoglobin on 2/25/2022 following the syncopal episode was 10.9.  His blood pressure medications are being titrated by his PCP.  He is planned for repeat hemoglobin in 8 weeks with follow-up appointment in 3 months.  Labs 6/22/2022:Hemoglobin 9.7, hematocrit 34.4, MCV 73.  Patient presents with his daughter. He continues to have difficulty breathing. He is on continuous oxygen. He is followed by Dr. Mccurdy. His Ofev has been increased to 150 mg BID. He had an echo and stress test performed after Labor Day. Based on his results, they did not feel an increase on oxygen therapy was warranted. He does admit to diarrhea predominant change in bowel habits due to Ofev. He takes an Imodium every 1-2 days with good control of his bowels. He is doing fairly well.  He is having a bowel movement every day and has been no blood or melena.  He denies any nausea or vomiting.  He's had no abdominal pain.  There is no heartburn or dysphagia.  His PCP is monitoring his hemoglobin is.  He is off his NSAIDs currently.